# Patient Record
Sex: MALE | Race: WHITE
[De-identification: names, ages, dates, MRNs, and addresses within clinical notes are randomized per-mention and may not be internally consistent; named-entity substitution may affect disease eponyms.]

---

## 2020-09-09 ENCOUNTER — HOSPITAL ENCOUNTER (EMERGENCY)
Dept: HOSPITAL 47 - EC | Age: 35
Discharge: HOME | End: 2020-09-09
Payer: COMMERCIAL

## 2020-09-09 VITALS — DIASTOLIC BLOOD PRESSURE: 73 MMHG | SYSTOLIC BLOOD PRESSURE: 135 MMHG | TEMPERATURE: 97.8 F

## 2020-09-09 VITALS — HEART RATE: 58 BPM | RESPIRATION RATE: 18 BRPM

## 2020-09-09 DIAGNOSIS — F17.200: ICD-10-CM

## 2020-09-09 DIAGNOSIS — Z91.040: ICD-10-CM

## 2020-09-09 DIAGNOSIS — J02.9: Primary | ICD-10-CM

## 2020-09-09 DIAGNOSIS — Z91.030: ICD-10-CM

## 2020-09-09 PROCEDURE — 87430 STREP A AG IA: CPT

## 2020-09-09 PROCEDURE — 99284 EMERGENCY DEPT VISIT MOD MDM: CPT

## 2020-09-09 PROCEDURE — 87081 CULTURE SCREEN ONLY: CPT

## 2020-09-09 PROCEDURE — 70490 CT SOFT TISSUE NECK W/O DYE: CPT

## 2020-09-09 NOTE — ED
ENT HPI





- General


Chief complaint: ENT


Stated complaint: ENT


Time Seen by Provider: 09/09/20 14:35


Source: patient


Mode of arrival: ambulatory


Limitations: no limitations





- History of Present Illness


Initial comments: 





Patient is a 34-year-old male presenting to the emergency Department with 

complaints of a sore throat and trouble swallowing for the past month.  Patient 

states his symptoms began with just a mild sore throat over the past few weeks 

he has been having trouble swallowing foods.  He states that he can only swallow

liquids at this time, otherwise he feels like food gets stuck and he chokes a 

little bit.  He denies any fever, chills, nausea or vomiting.  He states he's 

never had any chest congestion, cough, nasal congestion.  He denies any ear 

pain.  He states he's never had anything like this before.  He feels like his 

symptoms are getting worse over the past week that site came in for evaluation. 

He denies any pertinent past medical history, he isn't every day smoker.  He 

denies any other drug use.  He has no further complaints.  Upon arrival to the 

ER, his vitals are stable.





- Related Data


                                  Previous Rx's











 Medication  Instructions  Recorded


 


Penicillin V Potassium [Pen Vee K] 500 mg PO BID 10 Days #20 tablet 09/09/20


 


methylPREDNISolone [Medrol Dose 4 mg PO AS DIRECTED #1 pack 09/09/20





Pack]  











                                    Allergies











Allergy/AdvReac Type Severity Reaction Status Date / Time


 


bee venom protein (honey bee) Allergy  Anaphylaxis Verified 09/09/20 14:22


 


latex Allergy  Rash/Hives Verified 09/09/20 14:22














Review of Systems


ROS Statement: 


Those systems with pertinent positive or pertinent negative responses have been 

documented in the HPI.





ROS Other: All systems not noted in ROS Statement are negative.





Past Medical History


Past Medical History: GERD/Reflux


History of Any Multi-Drug Resistant Organisms: None Reported


Past Surgical History: Appendectomy


Past Psychological History: Anxiety


Smoking Status: Current every day smoker


Past Alcohol Use History: None Reported


Past Drug Use History: None Reported





General Exam





- General Exam Comments


Initial Comments: 





GENERAL: 


Patient is well-developed and well-nourished.  Patient is nontoxic and in no 

acute distress.





HEAD: 


Atraumatic, normocephalic.





EYES:


Pupils equal round and reactive to light, extraocular movements intact, sclera 

anicteric, conjunctiva are normal.  Eyelids were unremarkable.





ENT: 


TMs normal, nares patent, oropharynx is erythematous, tonsils are not enlarged, 

there is no exudate.  Moist mucous membranes.





NECK: 


Normal range of motion, supple without lymphadenopathy or JVD.  Soreness with 

palpation of bilateral lateral neck.  No obvious masses felt.





LUNGS:


Unlabored respirations.  Breath sounds clear to auscultation bilaterally and 

equal.  No wheezes rales or rhonchi.





HEART:


Regular rate and rhythm without murmurs, rubs or gallops.





ABDOMEN: 


Soft, nontender, normoactive bowel sounds.  No guarding, no rebound.  No masses 

appreciated.





: Deferred 





MUSCULOSKELETAL: 


Normal extremities with adequate strength and normal range of motion, no pitting

or edema.  No clubbing or cyanosis.





NEUROLOGICAL: 


Patient is alert and oriented x 3.  Motor and sensory are also intact.  Cranial 

nerves II through XII grossly intact.  Symmetrical smile.  Normal speech, normal

gait.   





PSYCH:


Normal mood, normal affect.





SKIN:


 Warm, Dry, normal turgor, no rashes or lesions noted.


Limitations: no limitations





Course


                                   Vital Signs











  09/09/20





  14:22


 


Temperature 98 F


 


Pulse Rate 58 L


 


Respiratory 18





Rate 


 


Blood Pressure 133/84


 


O2 Sat by Pulse 100





Oximetry 














Medical Decision Making





- Medical Decision Making





Patient is a 34-year-old male here for a sore throat and trouble swallowing for 

the past month.  His vitals are stable, no fevers.  Exam reveals an erythematous

oropharynx, no tonsillar enlargement, no exudate noted.  Strep is negative, 

culture is pending.  CT of the soft tissue neck reveals lymphadenopathy and 

additional areas of shotty adenopathy throughout the compartments of the soft 

tissues of the neck, no other acute finding.  I discussed these findings with 

the patient.  I will start patient on penicillin and a steroid for inflammation.

 I did recommend follow-up with an ENT, I will give referral.  Patient is stable

for discharge.  He is in agreement this plan of care.  Strict return parameters 

were discussed with the patient and he verbalized understanding.  Case discussed

with Dr. Leon.





- Lab Data


                                   Lab Results











  09/09/20 Range/Units





  14:56 


 


Group A Strep Rapid  Negative  (Negative)  














Disposition


Clinical Impression: 


 Sore throat, Dysphagia





Disposition: HOME SELF-CARE


Condition: Stable


Instructions (If sedation given, give patient instructions):  Pharyngitis (ED)


Additional Instructions: 


Please return to the Emergency Department if symptoms worsen or any other 

concerns.


Take both medications as prescribed.


Follow-up with ENT as discussed.


Prescriptions: 


methylPREDNISolone [Medrol Dose Pack] 4 mg PO AS DIRECTED #1 pack


Penicillin V Potassium [Pen Vee K] 500 mg PO BID 10 Days #20 tablet


Is patient prescribed a controlled substance at d/c from ED?: No


Referrals: 


None,Stated [Primary Care Provider] - 1-2 days


Sam Manzano DO [Doctor of Osteopathic Medicine] - 1-2 days

## 2020-09-09 NOTE — CT
EXAMINATION TYPE: CT soft tissue neck wo con

 

DATE OF EXAM: 9/9/2020

 

HISTORY: Difficulty swallowing x 1 month. Sore throat.

 

COMPARISON: None

 

CT DLP: 417.8 mGycm.  Automated Exposure Control for Dose Reduction was Utilized.

 

TECHNIQUE:  CT scan of the neck is without contrast

 

FINDINGS: Lack of contrast on a CT of the neck results in severely limited exam.

Grossly the orbits are symmetric and intact. Visualized intracranial structures are symmetric. Nasoph
arynx and oropharynx symmetric.

Parotid and submandibular glands are homogeneous.

Prevertebral soft tissue structures are within normal limits. Shotty adenopathy is seen scattered thr
oughout the compartments of the neck. Thyroid is homogeneous. Lung apices are clear.

Vocal cords are symmetric. Single area of pathologic adenopathy anterior to the carotid artery axial 
image 38 measuring short axis of 1.1 cm compatible with borderline adenopathy. Additional area of lym
phadenopathy in the left carotid space measuring short axis of 1.1 cm.

Incidental note made of a tiny punctate granuloma right upper lobe

 

IMPRESSION:

1. Markedly limited exam due to lack of contrast straight bilateral carotid space lymphadenopathy and
 additional areas of shotty adenopathy throughout the compartments of the soft tissues of the neck.

## 2020-11-04 ENCOUNTER — HOSPITAL ENCOUNTER (OUTPATIENT)
Dept: HOSPITAL 47 - ORWHC2ENDO | Age: 35
Discharge: HOME | End: 2020-11-04
Attending: INTERNAL MEDICINE
Payer: COMMERCIAL

## 2020-11-04 VITALS — RESPIRATION RATE: 16 BRPM | TEMPERATURE: 97.8 F

## 2020-11-04 VITALS — SYSTOLIC BLOOD PRESSURE: 130 MMHG | HEART RATE: 68 BPM | DIASTOLIC BLOOD PRESSURE: 70 MMHG

## 2020-11-04 VITALS — BODY MASS INDEX: 31.5 KG/M2

## 2020-11-04 DIAGNOSIS — Z91.030: ICD-10-CM

## 2020-11-04 DIAGNOSIS — K21.9: ICD-10-CM

## 2020-11-04 DIAGNOSIS — Z79.899: ICD-10-CM

## 2020-11-04 DIAGNOSIS — K44.9: Primary | ICD-10-CM

## 2020-11-04 DIAGNOSIS — Z98.890: ICD-10-CM

## 2020-11-04 DIAGNOSIS — F17.210: ICD-10-CM

## 2020-11-04 DIAGNOSIS — Z91.040: ICD-10-CM

## 2020-11-04 DIAGNOSIS — K22.70: ICD-10-CM

## 2020-11-04 PROCEDURE — 88305 TISSUE EXAM BY PATHOLOGIST: CPT

## 2020-11-04 PROCEDURE — 43239 EGD BIOPSY SINGLE/MULTIPLE: CPT

## 2020-11-04 NOTE — P.PCN
Date of Procedure: 11/04/20


Procedure(s) Performed: 


BRIEF HISTORY: Patient is a 34-year-old, pleasant, male scheduled for an upper 

endoscopy as a part of evaluation of progressive dysphagia to solids for the 

last 6 months duration.  Last 40 pounds.  He has long-standing history of GERD 

and has been taking omeprazole 40 mg twice daily.. 





PROCEDURE PERFORMED: Esophagogastroduodenoscopy biopsy and dilation.





PREOPERATIVE DIAGNOSIS: Progressive dysphagia to solids of 6 months duration and

long-standing history of GERD. 





IV sedation per anesthesia. 





PROCEDURE: After informed consent was obtained, the patient  was brought into 

the endoscopy unit. IV sedation was administered by Anesthesia under continuous 

monitoring. Initially the Olympus GIF-140 video endoscope was inserted into the 

mouth. Esophagus intubated without any difficulty. It was gradually advanced 

into the stomach and duodenum and carefully examined. The bulb and the second 

part of the duodenum appeared normal. The scope at this time was withdrawn to 

the stomach, adequately insufflated with air, and upon careful examination, 

mucosa of the antrum, body, cardia and the fundus appeared normal. The scope was

then withdrawn into the esophagus.  Small sliding Hiatal hernia noted.  The GE 

junction was located at 37 cm from the incisors.  It was a long segment of 

Stephenson's esophagus extending from 32-37 cm from the incisors and multiple 

biopsies were done from this area.  The rest of esophagus appeared normal. There

were no erosions or ulcerations seen and the patient tolerated the procedure 

well. 





IMPRESSION: 


1.  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 

with no mucosal nodularity status post multiple biopsies to rule out dysplasia.


2.  I'll hernia but no evidence of esophageal stricture.





RECOMMENDATIONS: The findings of this examination were discussed with the 

patient as well as his family..  He was advised to continue with Prilosec 40 mg 

twice daily and follow antireflux measures.  He'll be seen in office in 2 weeks.

## 2020-12-03 ENCOUNTER — HOSPITAL ENCOUNTER (OUTPATIENT)
Dept: HOSPITAL 47 - RADUSWWP | Age: 35
Discharge: HOME | End: 2020-12-03
Attending: INTERNAL MEDICINE
Payer: COMMERCIAL

## 2020-12-03 DIAGNOSIS — K44.9: Primary | ICD-10-CM

## 2020-12-03 DIAGNOSIS — K21.9: ICD-10-CM

## 2020-12-03 PROCEDURE — 74220 X-RAY XM ESOPHAGUS 1CNTRST: CPT

## 2020-12-03 NOTE — FL
EXAMINATION: Cervical and Thoracic Esophagram

 

DATE OF EXAM: 12/3/2020

 

CLINICAL INDICATION: 35-year-old male R13.10, unspecified dysphagia, reports food getting stuck in th
e throat for 4 months, worsening. Reports a history of Stephenson's esophagus.

 

COMPARISON: None

 

Total Fluoroscopy Time: 2 minutes 8 seconds

Total images: 48 

 

FINDINGS: 

The swallowing mechanism is normal. There is slight thickening of the cricopharyngeus without obstruc
tion. No diverticulum or web.

 

The thoracic portion has a normal course and caliber and normal motility. 

 

The mucosa is normal and no persistent filling defect is encountered. 

 

There is a small to moderate-sized sliding hiatal hernia and severe gastroesophageal reflux to the th
oracic inlet with Valsalva maneuver.

 

 

IMPRESSION: 

1. Small to moderate-sized sliding hiatal hernia with severe gastroesophageal reflux to the thoracic 
inlet.

2. Slight CP muscle hypertrophy without obstruction.

3. No stricture. Normal appearance to the mucosa.

## 2022-10-27 ENCOUNTER — HOSPITAL ENCOUNTER (EMERGENCY)
Dept: HOSPITAL 47 - EC | Age: 37
Discharge: HOME | End: 2022-10-27
Payer: COMMERCIAL

## 2022-10-27 VITALS
RESPIRATION RATE: 16 BRPM | DIASTOLIC BLOOD PRESSURE: 79 MMHG | HEART RATE: 62 BPM | SYSTOLIC BLOOD PRESSURE: 126 MMHG | TEMPERATURE: 97.9 F

## 2022-10-27 DIAGNOSIS — R10.9: Primary | ICD-10-CM

## 2022-10-27 DIAGNOSIS — Z91.030: ICD-10-CM

## 2022-10-27 DIAGNOSIS — Z79.84: ICD-10-CM

## 2022-10-27 DIAGNOSIS — F17.200: ICD-10-CM

## 2022-10-27 DIAGNOSIS — Z91.040: ICD-10-CM

## 2022-10-27 DIAGNOSIS — K21.9: ICD-10-CM

## 2022-10-27 LAB
ALBUMIN SERPL-MCNC: 4.6 G/DL (ref 3.5–5)
ALP SERPL-CCNC: 66 U/L (ref 38–126)
ALT SERPL-CCNC: 21 U/L (ref 4–49)
ANION GAP SERPL CALC-SCNC: 11 MMOL/L
APTT BLD: 25.6 SEC (ref 22–30)
AST SERPL-CCNC: 44 U/L (ref 17–59)
BASOPHILS # BLD AUTO: 0.1 K/UL (ref 0–0.2)
BASOPHILS NFR BLD AUTO: 1 %
BUN SERPL-SCNC: 13 MG/DL (ref 9–20)
CALCIUM SPEC-MCNC: 9.1 MG/DL (ref 8.4–10.2)
CHLORIDE SERPL-SCNC: 105 MMOL/L (ref 98–107)
CO2 SERPL-SCNC: 22 MMOL/L (ref 22–30)
EOSINOPHIL # BLD AUTO: 0.2 K/UL (ref 0–0.7)
EOSINOPHIL NFR BLD AUTO: 3 %
ERYTHROCYTE [DISTWIDTH] IN BLOOD BY AUTOMATED COUNT: 4.9 M/UL (ref 4.3–5.9)
ERYTHROCYTE [DISTWIDTH] IN BLOOD: 12.9 % (ref 11.5–15.5)
GLUCOSE SERPL-MCNC: 107 MG/DL (ref 74–99)
HCT VFR BLD AUTO: 42.2 % (ref 39–53)
HGB BLD-MCNC: 14.4 GM/DL (ref 13–17.5)
INR PPP: 1 (ref ?–1.2)
LIPASE SERPL-CCNC: 154 U/L (ref 23–300)
LYMPHOCYTES # SPEC AUTO: 2.4 K/UL (ref 1–4.8)
LYMPHOCYTES NFR SPEC AUTO: 39 %
MCH RBC QN AUTO: 29.3 PG (ref 25–35)
MCHC RBC AUTO-ENTMCNC: 34.1 G/DL (ref 31–37)
MCV RBC AUTO: 86 FL (ref 80–100)
MONOCYTES # BLD AUTO: 0.3 K/UL (ref 0–1)
MONOCYTES NFR BLD AUTO: 5 %
NEUTROPHILS # BLD AUTO: 3 K/UL (ref 1.3–7.7)
NEUTROPHILS NFR BLD AUTO: 50 %
PH UR: 8.5 [PH] (ref 5–8)
PLATELET # BLD AUTO: 258 K/UL (ref 150–450)
POTASSIUM SERPL-SCNC: 4.1 MMOL/L (ref 3.5–5.1)
PROT SERPL-MCNC: 7.2 G/DL (ref 6.3–8.2)
PROT UR QL: (no result)
PT BLD: 10.9 SEC (ref 9–12)
RBC UR QL: 2 /HPF (ref 0–5)
SODIUM SERPL-SCNC: 138 MMOL/L (ref 137–145)
SP GR UR: >1.05 (ref 1–1.03)
SQUAMOUS UR QL AUTO: <1 /HPF (ref 0–4)
UROBILINOGEN UR QL STRIP: <2 MG/DL (ref ?–2)
WBC # BLD AUTO: 6 K/UL (ref 3.8–10.6)
WBC #/AREA URNS HPF: 1 /HPF (ref 0–5)

## 2022-10-27 PROCEDURE — 85025 COMPLETE CBC W/AUTO DIFF WBC: CPT

## 2022-10-27 PROCEDURE — 80053 COMPREHEN METABOLIC PANEL: CPT

## 2022-10-27 PROCEDURE — 85610 PROTHROMBIN TIME: CPT

## 2022-10-27 PROCEDURE — 36415 COLL VENOUS BLD VENIPUNCTURE: CPT

## 2022-10-27 PROCEDURE — 96374 THER/PROPH/DIAG INJ IV PUSH: CPT

## 2022-10-27 PROCEDURE — 83690 ASSAY OF LIPASE: CPT

## 2022-10-27 PROCEDURE — 81001 URINALYSIS AUTO W/SCOPE: CPT

## 2022-10-27 PROCEDURE — 99284 EMERGENCY DEPT VISIT MOD MDM: CPT

## 2022-10-27 PROCEDURE — 96361 HYDRATE IV INFUSION ADD-ON: CPT

## 2022-10-27 PROCEDURE — 96375 TX/PRO/DX INJ NEW DRUG ADDON: CPT

## 2022-10-27 PROCEDURE — 85730 THROMBOPLASTIN TIME PARTIAL: CPT

## 2022-10-27 PROCEDURE — 93005 ELECTROCARDIOGRAM TRACING: CPT

## 2022-10-27 PROCEDURE — 74177 CT ABD & PELVIS W/CONTRAST: CPT

## 2022-10-27 PROCEDURE — 83605 ASSAY OF LACTIC ACID: CPT

## 2022-10-27 NOTE — CT
EXAMINATION TYPE: CT abdomen pelvis w con

 

DATE OF EXAM: 10/27/2022

 

COMPARISON: None

 

HISTORY: RLQ pain

 

CT DLP: 1043.3 mGycm

Automated exposure control for dose reduction was used.

 

CONTRAST: 

Performed with IV Contrast, patient injected with 100 mL of Isovue 300.

 

There is minimal subsegmental atelectasis at the lung bases. Heart size is normal. No pericardial eff
usion. There is small amount of fluid reflux into the esophagus. Liver spleen and stomach pancreas ap
pear intact. The bile ducts are not dilated. There is no adrenal mass. Kidneys show satisfactory cont
rast opacification. No hydronephrosis. Ureters are not dilated. No retroperitoneal adenopathy. Bladde
r distends smoothly. No inguinal hernia. No free fluid in the pelvis.

 

There is no mesenteric edema. No ascites or free air. No sign of a bowel obstruction. There are clips
 from apparent appendectomy. Appendix not seen. There is no intestinal wall thickening.

 

The lumbar vertebrae have normal alignment. Posterior elements are intact no compression fracture. Th
e bony pelvis is intact. The hip joints are intact. Sacroiliac joints are intact.

 

IMPRESSION:

No significant abnormality in the abdomen pelvis. Minimal esophageal reflux.

## 2022-10-27 NOTE — ED
Abdominal Pain HPI





- General


Chief Complaint: Abdominal Pain


Stated Complaint: Upper Abd pain, Chest Pain


Time Seen by Provider: 10/27/22 03:00


Source: patient


Mode of arrival: ambulatory


Limitations: no limitations





- History of Present Illness


Initial Comments: 





36-year-old male presents to the emergency Department with abdominal pain.  

States that it starts in the right lower quadrant and radiates up to the 

epigastric region.  Started around 1 AM he was watching TV.  No history of 

similar in the past.  He is status post appendectomy.  Admits nausea with dry 

heaving.  No fevers.  No chest pain or shortness of breath.  No changes in his 

bowel or bladder habits.  He did not attempt to take any medications at home for

her symptoms.  Has previously had EGD without peptic ulcer diagnosis.  No other 

alleviating, precipitating or modifying factors





- Related Data


                                Home Medications











 Medication  Instructions  Recorded  Confirmed


 


Omeprazole [PriLOSEC] 40 mg PO BID 11/02/20 11/04/20








                                  Previous Rx's











 Medication  Instructions  Recorded


 


Famotidine [Pepcid] 20 mg PO BID #28 tablet 10/27/22


 


HYDROcodone/APAP 7.5-325MG [Norco 1 tab PO Q6HR PRN 3 Days #12 tab 10/27/22





7.5-325]  


 


Sucralfate [Carafate] 1 gm PO ACHS #56 tab 10/27/22











                                    Allergies











Allergy/AdvReac Type Severity Reaction Status Date / Time


 


bee venom protein (honey bee) Allergy  Anaphylaxis Verified 11/04/20 10:46


 


latex Allergy  Rash/Hives Verified 11/04/20 10:46














Review of Systems


ROS Statement: 


Those systems with pertinent positive or pertinent negative responses have been 

documented in the HPI.





ROS Other: All systems not noted in ROS Statement are negative.





Past Medical History


Past Medical History: GERD/Reflux


History of Any Multi-Drug Resistant Organisms: None Reported


Past Surgical History: Appendectomy


Smoking Status: Current every day smoker





General Exam


Limitations: no limitations


General appearance: alert, in no apparent distress


Head exam: Present: atraumatic, normocephalic, normal inspection


Eye exam: Present: normal appearance, PERRL, EOMI.  Absent: scleral icterus, co

njunctival injection, periorbital swelling


ENT exam: Present: normal exam, mucous membranes moist


Neck exam: Present: normal inspection.  Absent: tenderness, meningismus, 

lymphadenopathy


Respiratory exam: Present: normal lung sounds bilaterally.  Absent: respiratory 

distress, wheezes, rales, rhonchi, stridor


Cardiovascular Exam: Present: regular rate, normal rhythm, normal heart sounds. 

 Absent: systolic murmur, diastolic murmur, rubs, gallop, clicks


GI/Abdominal exam: Present: soft, tenderness (rlq), normal bowel sounds.  

Absent: distended, guarding, rebound, rigid


Extremities exam: Present: normal inspection, full ROM, normal capillary refill.

  Absent: tenderness, pedal edema, joint swelling, calf tenderness


Back exam: Present: normal inspection


Neurological exam: Present: alert, oriented X3, CN II-XII intact


Psychiatric exam: Present: normal affect, normal mood


Skin exam: Present: warm, dry, intact, normal color.  Absent: rash





Course


                                   Vital Signs











  10/27/22





  02:51


 


Temperature 97.9 F


 


Pulse Rate 62


 


Respiratory 16





Rate 


 


Blood Pressure 126/79


 


O2 Sat by Pulse 98





Oximetry 














Medical Decision Making





- Medical Decision Making





Upon arrival patient was placed into room 9.  Thorough history and physical exam

 was performed.  IV access established.  Patient was given morphine for pain 

control.  Zofran given for nausea and 40 mg of Protonix as well as 1 L bolus of 

normal saline.  Laboratory studies are conducted and reviewed.  Patient does 

provide a urine sample.  CT is performed without identifiable cause for pain.  

Patient reevaluated and continues to have pain and therefore he is given a 

second dose of pain medications.  I discussed diagnosis, differential treatment 

options.  Patient's next step in evaluation may be an EGD.  They give him 

follow-up for GI and surgery.  Patient will be started on Carafate and Pepcid in

 addition to the omeprazole that he is already taking.  Patient will be given a 

prescription for Norco.  He is to follow-up with the referred physicians within 

the next week.  Take the medications as directed.  Return for any new or 

worsening symptoms.  Patient agreeable to the plan he was discharged home in 

stable condition





- Lab Data


Result diagrams: 


                                 10/27/22 02:57





                                 10/27/22 02:57


                                   Lab Results











  10/27/22 10/27/22 10/27/22 Range/Units





  02:57 02:57 02:57 


 


WBC  6.0    (3.8-10.6)  k/uL


 


RBC  4.90    (4.30-5.90)  m/uL


 


Hgb  14.4    (13.0-17.5)  gm/dL


 


Hct  42.2    (39.0-53.0)  %


 


MCV  86.0    (80.0-100.0)  fL


 


MCH  29.3    (25.0-35.0)  pg


 


MCHC  34.1    (31.0-37.0)  g/dL


 


RDW  12.9    (11.5-15.5)  %


 


Plt Count  258    (150-450)  k/uL


 


MPV  7.9    


 


Neutrophils %  50    %


 


Lymphocytes %  39    %


 


Monocytes %  5    %


 


Eosinophils %  3    %


 


Basophils %  1    %


 


Neutrophils #  3.0    (1.3-7.7)  k/uL


 


Lymphocytes #  2.4    (1.0-4.8)  k/uL


 


Monocytes #  0.3    (0-1.0)  k/uL


 


Eosinophils #  0.2    (0-0.7)  k/uL


 


Basophils #  0.1    (0-0.2)  k/uL


 


PT   10.9   (9.0-12.0)  sec


 


INR   1.0   (<1.2)  


 


APTT   25.6   (22.0-30.0)  sec


 


Sodium    138  (137-145)  mmol/L


 


Potassium    4.1  (3.5-5.1)  mmol/L


 


Chloride    105  ()  mmol/L


 


Carbon Dioxide    22  (22-30)  mmol/L


 


Anion Gap    11  mmol/L


 


BUN    13  (9-20)  mg/dL


 


Creatinine    0.68  (0.66-1.25)  mg/dL


 


Est GFR (CKD-EPI)AfAm    >90  (>60 ml/min/1.73 sqM)  


 


Est GFR (CKD-EPI)NonAf    >90  (>60 ml/min/1.73 sqM)  


 


Glucose    107 H  (74-99)  mg/dL


 


Plasma Lactic Acid Donte     (0.7-2.0)  mmol/L


 


Calcium    9.1  (8.4-10.2)  mg/dL


 


Total Bilirubin    0.8  (0.2-1.3)  mg/dL


 


AST    44  (17-59)  U/L


 


ALT    21  (4-49)  U/L


 


Alkaline Phosphatase    66  ()  U/L


 


Total Protein    7.2  (6.3-8.2)  g/dL


 


Albumin    4.6  (3.5-5.0)  g/dL


 


Lipase    154  ()  U/L


 


Urine Color     


 


Urine Appearance     (Clear)  


 


Urine pH     (5.0-8.0)  


 


Ur Specific Gravity     (1.001-1.035)  


 


Urine Protein     (Negative)  


 


Urine Glucose (UA)     (Negative)  


 


Urine Ketones     (Negative)  


 


Urine Blood     (Negative)  


 


Urine Nitrite     (Negative)  


 


Urine Bilirubin     (Negative)  


 


Urine Urobilinogen     (<2.0)  mg/dL


 


Ur Leukocyte Esterase     (Negative)  


 


Urine RBC     (0-5)  /hpf


 


Urine WBC     (0-5)  /hpf


 


Ur Squamous Epith Cells     (0-4)  /hpf


 


Urine Mucus     (None)  /hpf














  10/27/22 10/27/22 Range/Units





  02:57 05:32 


 


WBC    (3.8-10.6)  k/uL


 


RBC    (4.30-5.90)  m/uL


 


Hgb    (13.0-17.5)  gm/dL


 


Hct    (39.0-53.0)  %


 


MCV    (80.0-100.0)  fL


 


MCH    (25.0-35.0)  pg


 


MCHC    (31.0-37.0)  g/dL


 


RDW    (11.5-15.5)  %


 


Plt Count    (150-450)  k/uL


 


MPV    


 


Neutrophils %    %


 


Lymphocytes %    %


 


Monocytes %    %


 


Eosinophils %    %


 


Basophils %    %


 


Neutrophils #    (1.3-7.7)  k/uL


 


Lymphocytes #    (1.0-4.8)  k/uL


 


Monocytes #    (0-1.0)  k/uL


 


Eosinophils #    (0-0.7)  k/uL


 


Basophils #    (0-0.2)  k/uL


 


PT    (9.0-12.0)  sec


 


INR    (<1.2)  


 


APTT    (22.0-30.0)  sec


 


Sodium    (137-145)  mmol/L


 


Potassium    (3.5-5.1)  mmol/L


 


Chloride    ()  mmol/L


 


Carbon Dioxide    (22-30)  mmol/L


 


Anion Gap    mmol/L


 


BUN    (9-20)  mg/dL


 


Creatinine    (0.66-1.25)  mg/dL


 


Est GFR (CKD-EPI)AfAm    (>60 ml/min/1.73 sqM)  


 


Est GFR (CKD-EPI)NonAf    (>60 ml/min/1.73 sqM)  


 


Glucose    (74-99)  mg/dL


 


Plasma Lactic Acid Donte  1.5   (0.7-2.0)  mmol/L


 


Calcium    (8.4-10.2)  mg/dL


 


Total Bilirubin    (0.2-1.3)  mg/dL


 


AST    (17-59)  U/L


 


ALT    (4-49)  U/L


 


Alkaline Phosphatase    ()  U/L


 


Total Protein    (6.3-8.2)  g/dL


 


Albumin    (3.5-5.0)  g/dL


 


Lipase    ()  U/L


 


Urine Color   Yellow  


 


Urine Appearance   Clear  (Clear)  


 


Urine pH   8.5 H  (5.0-8.0)  


 


Ur Specific Gravity   >1.050 H  (1.001-1.035)  


 


Urine Protein   1+ H  (Negative)  


 


Urine Glucose (UA)   Negative  (Negative)  


 


Urine Ketones   Negative  (Negative)  


 


Urine Blood   Negative  (Negative)  


 


Urine Nitrite   Negative  (Negative)  


 


Urine Bilirubin   Negative  (Negative)  


 


Urine Urobilinogen   <2.0  (<2.0)  mg/dL


 


Ur Leukocyte Esterase   Negative  (Negative)  


 


Urine RBC   2  (0-5)  /hpf


 


Urine WBC   1  (0-5)  /hpf


 


Ur Squamous Epith Cells   <1  (0-4)  /hpf


 


Urine Mucus   Rare H  (None)  /hpf














- EKG Data


EKG Comments: 





EKG demonstrates sinus rhythm with a rate of 60.  MO interval 148.  .  

.  No acute ST segment elevations or depressions





Disposition


Clinical Impression: 


 Abdominal pain





Disposition: HOME SELF-CARE


Condition: Stable


Instructions (If sedation given, give patient instructions):  Abdominal Pain 

(ED)


Additional Instructions: 


Please take the new medications as prescribed.  Follow up with your primary care

 doctor in 2- 4 days and return for any new or worsening symptoms. I recommend 

an EGD as the next step in evaluating your pain. 


Prescriptions: 


Sucralfate [Carafate] 1 gm PO ACHS #56 tab


HYDROcodone/APAP 7.5-325MG [Norco 7.5-325] 1 tab PO Q6HR PRN 3 Days #12 tab


 PRN Reason: Pain


Famotidine [Pepcid] 20 mg PO BID #28 tablet


Is patient prescribed a controlled substance at d/c from ED?: Yes


When asked, does pt state using other controlled substances?: No


If prescribed controlled substance>3 days was MAPS reviewed?: Prescribed <3 Days


Referrals: 


None,Stated [Primary Care Provider] - 1-2 days


Maria Elena Hernandez MD [STAFF PHYSICIAN] - 1-2 days


Cecelia Mcginnis MD [STAFF PHYSICIAN] - 1-2 days


Time of Disposition: 06:20

## 2022-11-04 ENCOUNTER — HOSPITAL ENCOUNTER (OUTPATIENT)
Dept: HOSPITAL 47 - RADUSWWP | Age: 37
Discharge: HOME | End: 2022-11-04
Attending: INTERNAL MEDICINE
Payer: COMMERCIAL

## 2022-11-04 DIAGNOSIS — K80.20: Primary | ICD-10-CM

## 2022-11-04 PROCEDURE — 76700 US EXAM ABDOM COMPLETE: CPT

## 2022-11-04 NOTE — US
EXAMINATION TYPE: US abdomen complete

 

DATE OF EXAM: 11/4/2022

 

COMPARISON: CT

 

CLINICAL HISTORY: R10.11 RUQ ABDOMINAL PAIN. Pt states RUQ, back, and chest pain especially after eat
ing

 

TECHNIQUE: Multiple sonographic images of the abdomen are obtained.

 

FINDINGS:

 

EXAM MEASUREMENTS:

 

Liver Length:  18.9 cm   

Gallbladder Wall:  0.2 cm   

CBD:  0.3 cm

Spleen:  12.1 cm   

Right Kidney:  10.3 x 4.4 x 4.9 cm 

Left Kidney:  11.8 x 4.9 x 4.7 cm   

 

SONOGRAPHER NOTES:

 

Pancreas:  Obscured by bowel gas

Liver:  Upper limits of normal for size, otherwise appeared wnl  

Gallbladder:  Multiple, small, mobile gallstones- wall not thickened

**Evidence for sonographic Trujillo's sign:  Yes

CBD:  wnl 

Spleen:  wnl- accessory spleen= 2.0 cm   

Right Kidney:  wnl   

Left Kidney:  wnl, lower pole gassed out   

Upper IVC:  wnl  

Abd Aorta:  Proximal portion gassed out, mid and distal portions appeared wnl

 

 

IMPRESSION: 

1.  No evidence for acute intraluminal process.

2.  Multiple layering gallstones present.

## 2023-08-21 ENCOUNTER — HOSPITAL ENCOUNTER (OUTPATIENT)
Dept: HOSPITAL 47 - RADXRMAIN | Age: 38
Discharge: HOME | End: 2023-08-21
Attending: INTERNAL MEDICINE
Payer: COMMERCIAL

## 2023-08-21 DIAGNOSIS — M54.50: Primary | ICD-10-CM

## 2023-08-21 DIAGNOSIS — M85.88: ICD-10-CM

## 2023-08-21 PROCEDURE — 72100 X-RAY EXAM L-S SPINE 2/3 VWS: CPT

## 2023-08-21 NOTE — XR
EXAM TYPE: LUMBAR SPINE X RAY SERIES

 

COMPARISON: NONE

 

HISTORY: Pain

 

TECHNIQUE: 4 views are submitted.

 

FINDINGS:

Alignment is anatomic.  The pedicles are intact.  The transverse processes are intact.  There is surg
ical clips in the right upper quadrant. Diffuse osteopenia. There is moderate degenerative change L4-
5 and mild degenerative change at remaining levels. Degenerative disc disease L5-S1. Diffuse osteopen
ia and multilevel facet arthropathy.

 

IMPRESSION:

1. Diffuse osteopenia with multilevel degenerative disc disease most marked at levels L3-S1. Recommen
d MRI.